# Patient Record
Sex: FEMALE | Race: WHITE | Employment: OTHER | URBAN - METROPOLITAN AREA
[De-identification: names, ages, dates, MRNs, and addresses within clinical notes are randomized per-mention and may not be internally consistent; named-entity substitution may affect disease eponyms.]

---

## 2022-10-07 RX ORDER — DIPHENHYDRAMINE HYDROCHLORIDE 50 MG/ML
50 INJECTION, SOLUTION INTRAMUSCULAR; INTRAVENOUS ONCE
Status: CANCELLED | OUTPATIENT
Start: 2022-10-07 | End: 2022-10-07

## 2022-10-07 RX ORDER — ATROPINE SULFATE 0.1 MG/ML
0.5 INJECTION INTRAVENOUS
Status: CANCELLED | OUTPATIENT
Start: 2022-10-07 | End: 2022-10-08

## 2022-10-07 RX ORDER — SODIUM CHLORIDE 0.9 % (FLUSH) 0.9 %
5-40 SYRINGE (ML) INJECTION EVERY 8 HOURS
Status: CANCELLED | OUTPATIENT
Start: 2022-10-07

## 2022-10-07 RX ORDER — SODIUM CHLORIDE 0.9 % (FLUSH) 0.9 %
5-40 SYRINGE (ML) INJECTION AS NEEDED
Status: CANCELLED | OUTPATIENT
Start: 2022-10-07

## 2022-10-07 RX ORDER — EPINEPHRINE 0.1 MG/ML
1 INJECTION INTRACARDIAC; INTRAVENOUS
Status: CANCELLED | OUTPATIENT
Start: 2022-10-07 | End: 2022-10-08

## 2022-10-07 RX ORDER — DEXTROMETHORPHAN/PSEUDOEPHED 2.5-7.5/.8
1.2 DROPS ORAL
Status: CANCELLED | OUTPATIENT
Start: 2022-10-07

## 2022-10-10 ENCOUNTER — HOSPITAL ENCOUNTER (OUTPATIENT)
Age: 62
Setting detail: OUTPATIENT SURGERY
Discharge: HOME | End: 2022-10-10
Attending: INTERNAL MEDICINE | Admitting: INTERNAL MEDICINE
Payer: COMMERCIAL

## 2022-10-10 VITALS
DIASTOLIC BLOOD PRESSURE: 59 MMHG | HEART RATE: 58 BPM | HEIGHT: 60 IN | TEMPERATURE: 97.7 F | SYSTOLIC BLOOD PRESSURE: 102 MMHG | OXYGEN SATURATION: 100 % | RESPIRATION RATE: 16 BRPM | WEIGHT: 127.1 LBS | BODY MASS INDEX: 24.95 KG/M2

## 2022-10-10 PROCEDURE — 74011250636 HC RX REV CODE- 250/636: Performed by: INTERNAL MEDICINE

## 2022-10-10 PROCEDURE — 2709999900 HC NON-CHARGEABLE SUPPLY: Performed by: INTERNAL MEDICINE

## 2022-10-10 PROCEDURE — G0500 MOD SEDAT ENDO SERVICE >5YRS: HCPCS | Performed by: INTERNAL MEDICINE

## 2022-10-10 PROCEDURE — 99153 MOD SED SAME PHYS/QHP EA: CPT | Performed by: INTERNAL MEDICINE

## 2022-10-10 PROCEDURE — 77030040361 HC SLV COMPR DVT MDII -B: Performed by: INTERNAL MEDICINE

## 2022-10-10 PROCEDURE — 76040000008: Performed by: INTERNAL MEDICINE

## 2022-10-10 RX ORDER — NALOXONE HYDROCHLORIDE 0.4 MG/ML
0.4 INJECTION, SOLUTION INTRAMUSCULAR; INTRAVENOUS; SUBCUTANEOUS
Status: DISCONTINUED | OUTPATIENT
Start: 2022-10-10 | End: 2022-10-10 | Stop reason: HOSPADM

## 2022-10-10 RX ORDER — THERA TABS 400 MCG
1 TAB ORAL DAILY
COMMUNITY

## 2022-10-10 RX ORDER — MIDAZOLAM HYDROCHLORIDE 1 MG/ML
.25-5 INJECTION, SOLUTION INTRAMUSCULAR; INTRAVENOUS
Status: DISCONTINUED | OUTPATIENT
Start: 2022-10-10 | End: 2022-10-10 | Stop reason: HOSPADM

## 2022-10-10 RX ORDER — SODIUM CHLORIDE 9 MG/ML
1000 INJECTION, SOLUTION INTRAVENOUS CONTINUOUS
Status: DISCONTINUED | OUTPATIENT
Start: 2022-10-10 | End: 2022-10-10 | Stop reason: HOSPADM

## 2022-10-10 RX ORDER — FENTANYL CITRATE 50 UG/ML
100 INJECTION, SOLUTION INTRAMUSCULAR; INTRAVENOUS
Status: DISCONTINUED | OUTPATIENT
Start: 2022-10-10 | End: 2022-10-10 | Stop reason: HOSPADM

## 2022-10-10 RX ORDER — FLUMAZENIL 0.1 MG/ML
0.2 INJECTION INTRAVENOUS
Status: DISCONTINUED | OUTPATIENT
Start: 2022-10-10 | End: 2022-10-10 | Stop reason: HOSPADM

## 2022-10-10 RX ADMIN — SODIUM CHLORIDE 1000 ML: 9 INJECTION, SOLUTION INTRAVENOUS at 08:10

## 2022-10-10 NOTE — PROCEDURES
[facility]  Colonoscopy Procedure Report  _______________________________________________________  Patient: Keith Ryan                                        Attending Physician: Dangelo Martinez MD    Patient ID: [id number]                                    Referring Physician: Sumi Brady    Exam Date: 10/10/2022      Introduction: A  58 y.o. female patient, presents for inpatient Colonoscopy    Indications: Screening for colon cancer average risk and asymptomatic. Pt of Dr. Hayley Cartagena, here for her first screening colonoscopy. Pt states that she had a Cologuard 2yrs ago (Negative, Per Pt - No records available). -Old Cologuard records unavailable at this time, any records recovered will be scanned to chart at later date. No FHx of colon cancer. Father - Cirrhosis of Liver. Asymptomatic of GI complaints. BMI: 29.5 (Short Frame), BM: 1/day. *PM/SH: HTN (No Meds), Bronchitis. No surgery    Consent: The benefits, risks, and alternatives to the procedure were discussed and informed consent was obtained from the patient. Preparation: EKG, pulse, pulse oximetry and blood pressure were monitored throughout the procedure. ASA Classification: Class I- . The heart is an S1-S2 and regular heart rate and rhythm. Lungs are clear to auscultation and percussion. Abdomen is soft, nondistended, and nontender. Mental Status: awake, alert, and oriented to person, place, and time    Medications:  Fentanyl 200 mcg IV and Versed 7 mg IV throughout the procedure. Rectal Exam: Normal Rectal Exam. No Blood. Pathology Specimens:  0    Procedure: The colonoscope was passed with difficulty through the anus under direct visualization and advanced to the cecum and 5 cm inside the terminal ileum. Retroflexion is made in the ascending colon. The patient required positioning on the back to aid in the passage of the scope. The scope was withdrawn and the mucosa was carefully examined.  The quality of the preparation was excellent. The views were excellent. The patient's toleration of the procedure was very good. The exam was done twice to the cecum. Total time is 35 minutes and withdrawal time is 22 minutes. Findings:    Rectum:   Normal  Sigmoid:   Very tortuous sigmoid colon with moderate sigmoid diverticulosis. Descending Colon:   Normal   Transverse Colon: Moderate diverticulosis of the transverse colon and the hepatic flexure. Tortuous colon and hepatic flexure. The cecum is found in the LLQ crossing from the LLQ  Ascending Colon: Moderate diverticulosis of the hepatic flexure. Tortuous hepatic flexure. Cecum:   The cecum is found in the LLQ crossing from the LLQ  Terminal Ileum:   Normal.     Unplanned Events: There were no unplanned events. Estimated Blood Loss: None  IMPLANTS: * No implants in log *  Impressions: Difficult  colonoscopy. Very tortuous sigmoid colon with moderate sigmoid diverticulosis. Moderate diverticulosis of the transverse colon and the hepatic flexure. Tortuous colon and hepatic flexure. The cecum is found in the LLQ crossing from the LLQ. Normal Mucosa. No blood, polyps or AVM found. Complications: None; patient tolerated the procedure well. Recommendations:  Discharge home when standard parameters are met. Resume a high fiber diet. Resume own medications. Colonoscopy recommendation in 10 years.   Take Miralax and/ or Colace 100 mg on regular basis if constipated    Procedure Codes:    COLONOSCOPY [DFL0283]    Endoscope Information:  Model Number(s)    [id number]   Assistant: None  Signed By: Tyler Gonzalez MD Date: 10/10/2022

## 2022-10-10 NOTE — DISCHARGE INSTRUCTIONS
Shani Nixon  [id number]  1960    COLON DISCHARGE INSTRUCTIONS    Discomfort:  Redness at IV site- apply warm compress to area; if redness or soreness persist- contact your physician  There may be a slight amount of blood passed from the rectum  Gaseous discomfort- walking, belching will help relieve any discomfort  You may not operate a vehicle til the next day. You may not engage in an occupation involving machinery or appliances til the next day. You may not drink alcoholic beverages til the next day. DIET:   High fiber diet. ACTIVITY:  You may not  resume your normal daily activities til the next day. it is recommended that you spend the remainder of the day resting -  avoid any strenuous activity. CALL M.D.  IF ANY SIGN OF:   Increasing pain, nausea, vomiting  Abdominal distension (swelling)  New increased bleeding (oral or rectal)  Fever (chills)  Pain in chest area  Bloody discharge from nose or mouth  Shortness of breath    You may  take any Advil, Aspirin, Ibuprofen, Motrin, Aleve, or Goodys ONLY  Tylenol as needed for pain. Post procedure diagnosis:  SIGMOID DIVERTICULOSIS; TORTUOUS COLON;    Follow-up Instructions: Your follow up colonoscopy will be in 10 years. Obed Dandy, MD  October 10, 2022       DISCHARGE SUMMARY from Nurse    PATIENT INSTRUCTIONS:    After general anesthesia or intravenous sedation, for 24 hours or while taking prescription Narcotics:  Limit your activities  Do not drive and operate hazardous machinery  Do not make important personal or business decisions  Do  not drink alcoholic beverages  If you have not urinated within 8 hours after discharge, please contact your surgeon on call.     Report the following to your surgeon:  Excessive pain, swelling, redness or odor of or around the surgical area  Temperature over 100.5  Nausea and vomiting lasting longer than 4 hours or if unable to take medications  Any signs of decreased circulation or nerve impairment to extremity: change in color, persistent  numbness, tingling, coldness or increase pain  Any questions    What to do at Home:  Recommended activity: Activity as tolerated and no driving for today. If you experience any of the following symptoms as above, please follow up with Dr. Ruthie Weldon. *  Please give a list of your current medications to your Primary Care Provider. *  Please update this list whenever your medications are discontinued, doses are      changed, or new medications (including over-the-counter products) are added. *  Please carry medication information at all times in case of emergency situations. These are general instructions for a healthy lifestyle:    No smoking/ No tobacco products/ Avoid exposure to second hand smoke  Surgeon General's Warning:  Quitting smoking now greatly reduces serious risk to your health. Obesity, smoking, and sedentary lifestyle greatly increases your risk for illness    A healthy diet, regular physical exercise & weight monitoring are important for maintaining a healthy lifestyle    You may be retaining fluid if you have a history of heart failure or if you experience any of the following symptoms:  Weight gain of 3 pounds or more overnight or 5 pounds in a week, increased swelling in our hands or feet or shortness of breath while lying flat in bed. Please call your doctor as soon as you notice any of these symptoms; do not wait until your next office visit. The discharge information has been reviewed with the patient and spouse. The patient and spouse verbalized understanding. Discharge medications reviewed with the patient and spouse and appropriate educational materials and side effects teaching were provided. ___________________________________________________________________________________________________________________________________    Patient armband removed and shredded.

## 2022-10-10 NOTE — H&P
Assessment/Plan  # Detail Type Description    1. Assessment Encounter for screening for cancer of colon (Z12.11). Impression Pt of Dr. Manny Reagan, here for her first screening colonoscopy. Pt states that she had a Cologuard 2yrs ago (Negative, Per Pt - No records available). -Old Cologuard records unavailable at this time, any records recovered will be scanned to chart at later date.  _____________________________________  Average risk, no FHx of colon cancer. Father - Cirrhosis of Liver. Asymptomatic of GI complaints. BMI: 29.5 (Short Frame), BM: 1/day. *PM/SH: HTN (No Meds), Bronchitis. No reported hx of abdominal surgeries, strokes, or CAD. Patient Plan *C-scope Plan:  First colonoscopy ordered with Dr. Malik Chapman with Miralax bowel prep, and Mag Citrate 2 days before prep. -Bring inhalers to procedure. *C-scope Risks:  Stressed importance of following all bowel preparation instructions. Explained the procedure to the patient including all risks and benefits. These risks consist of missed lesions on exam, bleeding, and bowel perforation with possible need for admission to the hospital, and in the most extensive of  circumstances, the patient may require surgery. Pt verbalized understanding of these risks and is agreeable with this procedure. Plan Orders Further diagnostic evaluations ordered today include(s) DIAGNOSTIC COLONOSCOPY to be performed. This 58year old  patient was referred by [facility]. This 58year old female presents for Colon Cancer Screening. History of Present Illness  1. Colon Cancer Screening   No prior screening. Denies risk factors. Pertinent negatives include abdominal pain, change in bowel habits, change in stool caliber, constipation, decreased appetite, diarrhea, melena, nausea, rectal bleeding, vomiting, weight gain and weight loss.   Additional information: No family history of colon cancer and  Pt states that she had a Cologuard  2yrs  ago ( negative)   Bm.  1 x daily. Problem List  Problem Description Onset Date Chronic Clinical Status Notes   Acute maxillary sinusitis 11/15/2012 Y     Acute upper respiratory infection 12/10/2013 Y     Urinary tract infectious disease 2013 Y     Depressive disorder 2013 Y     Essential hypertension 2013 Y       Past Medical/Surgical History   (Detailed)  Disease/disorder Onset Date Management Date Comments   Hypertension           Gynecologic History  Patient is postmenopausal.       Family History   (Detailed)    Relationship Family Member Name  Age at Death Condition Onset Age Cause of Death   Father  N  Cirrhosis of liver  N   Mother  N  Hypertension  N   Mother  N  Asthma  N   Mother  N  Arthritis  N   Family History Comments  Relationship Family Member Name Condition Comments   Father  Cirrhosis of liver    Mother  Hypertension    Mother  Asthma    Mother  Arthritis      Social History  (Detailed)  Tobacco use reviewed. Preferred language is [place]. Marital Status/Family/Social Support  Marital status:      Tobacco use status: Occasional cigarette smoker. Smoking status: Former smoker. Tobacco Screening  Patient has used tobacco. Patient has not used tobacco in the last 30 days. Patient has not used smokeless tobacco in the last 30 days. Smoking Status  Type Smoking Status Usage Per Day Years Used Pack Years Total Pack Years   Cigarette Former smoker         Alcohol  There is a history of alcohol use. consumed socially. Caffeine  The patient uses caffeine: coffee. Medications (active prior to today)  Medication Instructions Start Date Stop Date Refilled Elsewhere   ALBUTEROL HFA (PROVENTIL) INH INHALE 2 PUFFS BY MOUTH EVERY 4 TO 6 HOURS AS NEEDED 2021 N       Medication Reconciliation  Medications reconciled today.     Medication Reviewed  Adherence Medication Name Sig Desc Elsewhere Status taking as directed ALBUTEROL HFA (PROVENTIL) INH INHALE 2 PUFFS BY MOUTH EVERY 4 TO 6 HOURS AS NEEDED N Verified     Medications (Added, Continued or Stopped today)  Start Date Medication Directions PRN Status PRN Reason Instruction Stop Date   04/06/2021 ALBUTEROL HFA (PROVENTIL) INH INHALE 2 PUFFS BY MOUTH EVERY 4 TO 6 HOURS AS NEEDED N        Allergies  Ingredient Reaction (Severity) Medication Name Comment   PENICILLINS      SULFA (SULFONAMIDE ANTIBIOTICS)        Reviewed, no changes. Review of Systems  System Neg/Pos Details   Constitutional Negative Chills, Fever, Malaise, Weight gain and Weight loss. ENMT Negative Ear infections, Nasal congestion, Sinus Infection and Sore throat. Eyes Negative Double vision and Eye pain. Respiratory Negative Asthma, Chronic cough, Dyspnea, Pleuritic pain and Wheezing. Cardio Negative Chest pain, Edema and Irregular heartbeat/palpitations. GI Negative Abdominal pain, Change in bowel habits, Change in stool caliber, Constipation, Decreased appetite, Diarrhea, Dysphagia, Heartburn, Hematemesis, Hematochezia, Melena, Nausea, Rectal bleeding, Reflux and Vomiting.  Negative Dysuria, Hematuria, Urinary frequency, Urinary incontinence and Urinary retention. Endocrine Negative Cold intolerance, Heat intolerance and Increased thirst.   Neuro Negative Dizziness, Headache, Numbness, Tremors and Vertigo. Psych Negative Anxiety, Depression and Increased stress. Integumentary Negative Hives, Pruritus and Rash. MS Negative Back pain, Joint pain and Myalgia. Hema/Lymph Negative Easy bleeding, Easy bruising and Lymphadenopathy. Reproductive Positive The patient is post-menopausal.   Reproductive Negative Breast lumps, Breast pain and Vaginal discharge.        Vital Signs   Gynecologic History  Patient is postmenopausal.      Height  Time ft in cm Last Measured Height Position   9:53 AM 5.0  152.40 03/09/2022      Weight/BSA/BMI  Time lb oz kg Context BMI kg/m2 BSA m2   9:53 .00  60.328 dressed with shoes 25.97      Blood Pressure  Time BP mm/Hg Position Side Site Method Cuff Size   9:53 /76 sitting right arm automatic adult   9:51 AM     automatic      Date/Time Temp Pulse BP Arterial Line 1 BP (mmHg) BP Patient Position Resp SpO2 O2 Device O2 Flow Rate (L/min) Level of Consciousness MEWS Score Weight   10/10/22 0804 97.9 °F (36.6 °C) 69 150/75 Abnormal  -- -- 16 -- None (Room air) -- 0 1 --   10/10/22 0742 -- -- -- -- -- -- -- -- -- -- -- 57.7 kg (127 lb 1.6 oz)     Physical  Exam  Exam Findings Details   Female GI Quick Visit Comments Short Frame. Constitutional Normal Well developed. Eyes Normal Conjunctiva - Right: Normal, Left: Normal. Sclera - Right: Normal, Left: Normal.   Nasopharynx Normal Lips/teeth/gums - Normal.   Neck Exam Normal Inspection - Normal.   Respiratory Normal Inspection - Normal.   Cardiovascular Normal Regular rate and rhythm. No murmurs, gallops, or rubs. Vascular Normal Pulses - Brachial: Normal.   Skin Normal Inspection - Normal.   Musculoskeletal Normal Hands/Wrist - Right: Normal, Left: Normal.   Extremity Normal No edema. Neurological Normal Fine motor skills - Normal.   Psychiatric Normal Orientation - Oriented to time, place, person & situation. Appropriate mood and affect.    Immunizations Entered by History  Date Immunization   9/27/2013 12:00:00 AM flu (split) (3 yrs or older)   11/2/2012 12:00:00 AM flu (split) (3 yrs or older)       Active Patient Care Team Members  Name Contact Agency Type Support Role Relationship Active Date Inactive Date Specialty   Nara Alicia   Patient provider PCP   [facility]    Child, Mother is the Patient      Liz Monk   encounter provider    Gastroenterology     Patient is questioned and examined

## (undated) DEVICE — TRNQT TEXT 1X18IN BLU LF DISP -- CONVERT TO ITEM 362165

## (undated) DEVICE — TUBING, SUCTION, 1/4" X 12', STRAIGHT: Brand: MEDLINE

## (undated) DEVICE — SYRINGE 50ML E/T

## (undated) DEVICE — Device

## (undated) DEVICE — GARMENT,MEDLINE,DVT,INT,CALF,MED, GEN2: Brand: MEDLINE

## (undated) DEVICE — SYR 3ML LL TIP 1/10ML GRAD --

## (undated) DEVICE — SINGLE PORT MANIFOLD: Brand: NEPTUNE 2

## (undated) DEVICE — SPONGE GZ W4XL4IN COT 12 PLY TYP VII WVN C FLD DSGN

## (undated) DEVICE — SPONGE GZ W4XL4IN RAYON POLY 4 PLY NONWOVEN FASTER WICKING

## (undated) DEVICE — WRISTBAND ID AD W2.5XL9.5CM RED VYN ADH CLSR UNI-PRINT

## (undated) DEVICE — KENDALL RADIOLUCENT FOAM MONITORING ELECTRODE RECTANGULAR SHAPE: Brand: KENDALL

## (undated) DEVICE — CATH IV SAFE STR 22GX1IN BLU -- PROTECTIV PLUS

## (undated) DEVICE — SYR 5ML 1/5 GRAD LL NSAF LF --

## (undated) DEVICE — SET ADMIN 16ML TBNG L100IN 2 Y INJ SITE IV PIGGY BK DISP

## (undated) DEVICE — MAJ-1414 SINGLE USE ADPATER BIOPSY VALV: Brand: SINGLE USE ADAPTOR BIOPSY VALVE

## (undated) DEVICE — NDL FLTR TIP 5 MIC 18GX1.5IN --

## (undated) DEVICE — SOLUTION IV 500ML 0.9% SOD CHL FLX CONT

## (undated) DEVICE — CANNULA CUSH AD W/ 14FT TBG

## (undated) DEVICE — NDL PRT INJ NSAF BLNT 18GX1.5 --

## (undated) DEVICE — CATH SUC CTRL PRT TRIFLO 14FR --

## (undated) DEVICE — TRAP SPEC COLL POLYP POLYSTYR --